# Patient Record
Sex: MALE | Race: BLACK OR AFRICAN AMERICAN
[De-identification: names, ages, dates, MRNs, and addresses within clinical notes are randomized per-mention and may not be internally consistent; named-entity substitution may affect disease eponyms.]

---

## 2019-12-10 ENCOUNTER — HOSPITAL ENCOUNTER (OUTPATIENT)
Dept: HOSPITAL 19 - SDCO | Age: 24
Discharge: HOME | End: 2019-12-10
Attending: SPECIALIST
Payer: SELF-PAY

## 2019-12-10 VITALS — WEIGHT: 190.04 LBS | BODY MASS INDEX: 26.6 KG/M2 | HEIGHT: 70.98 IN

## 2019-12-10 VITALS — SYSTOLIC BLOOD PRESSURE: 153 MMHG | TEMPERATURE: 98.5 F | HEART RATE: 86 BPM | DIASTOLIC BLOOD PRESSURE: 98 MMHG

## 2019-12-10 VITALS — HEART RATE: 86 BPM | SYSTOLIC BLOOD PRESSURE: 139 MMHG | DIASTOLIC BLOOD PRESSURE: 8 MMHG | TEMPERATURE: 98.5 F

## 2019-12-10 VITALS — SYSTOLIC BLOOD PRESSURE: 148 MMHG | HEART RATE: 87 BPM | DIASTOLIC BLOOD PRESSURE: 88 MMHG

## 2019-12-10 VITALS — DIASTOLIC BLOOD PRESSURE: 90 MMHG | HEART RATE: 84 BPM | SYSTOLIC BLOOD PRESSURE: 145 MMHG

## 2019-12-10 DIAGNOSIS — F17.210: ICD-10-CM

## 2019-12-10 DIAGNOSIS — K29.30: Primary | ICD-10-CM

## 2019-12-10 DIAGNOSIS — Z88.8: ICD-10-CM

## 2019-12-10 DIAGNOSIS — R19.7: ICD-10-CM

## 2019-12-10 DIAGNOSIS — K76.0: ICD-10-CM

## 2019-12-10 DIAGNOSIS — R16.0: ICD-10-CM

## 2019-12-10 DIAGNOSIS — Z88.6: ICD-10-CM

## 2019-12-10 DIAGNOSIS — B19.9: ICD-10-CM

## 2019-12-10 NOTE — NUR
The patient was escorted out via wheelchair to a private vehicle by SHEY Rivera.
The patient's belongings and discharge paperwork were sent with him. The
patient's friend, Geovani, is present to drive him home.

## 2019-12-10 NOTE — NUR
The patient arrived back to [facility] from the endoscopy suite at this time. The
patient appears drowsy but arouses easily to his name. The patient ambulated
to the cart with the stand by assitance of two nurses and appeared to tolerate
the activity well. Post procedure vital signs were started at this time. The
patient agrees to try some apple juice. Call light is within reach. Will
continue to monitor the patient.

## 2019-12-10 NOTE — NUR
The patient appears to be tolerating the juice well and denies wanting
anything further to eat or drink at this time. Dr. Manzano was at the
patient's bedside to discuss the findings of the procedure.

## 2019-12-10 NOTE — NUR
Discharge instructions were reviewed with the patient at this time and he
verbalized understanding at this time. The nurse again discussed with the
patient his current mental health and whether he felt comfortable going home
or if he was having thoughts of harming himself. The nurse told the patient
that there were resources available to him and we wanted to provided him the
help he needed. He told the nurse that he has an emotional support dog at home
and he was avoiding alcohol and "other mind altering substances" until his
appointment with [facility] next week. The nurse told the patient to call [facility]
if he felt that he needed to be seen sooner or to go to the nearest emergency
room if he starts that have thoughts of harming himself. He verbalized
understanding and denies wanting anything further services at this time. The
patient's IV to his right hand was removed and a pressure dressing was applied
to the site.

## 2019-12-10 NOTE — NUR
The patient answered yes to question 1 on the suicide risk screening. The
nurse discussed with the patient if he had anyone he could reach out to in a
time of need. He stated that he can call his mother but she lives over 18
hours away. The patient did inform the nurse that he has plans to start seeing
someone at [facility] as he has recently found that he "can't handle things on his
own anymore". The nurse encouraged the patient to follow through with starting
treatment there and also informed the patient that he can and should always go
to the nearest emergency room if he had thoughts of harming himself.